# Patient Record
Sex: MALE | Race: WHITE
[De-identification: names, ages, dates, MRNs, and addresses within clinical notes are randomized per-mention and may not be internally consistent; named-entity substitution may affect disease eponyms.]

---

## 2021-02-12 ENCOUNTER — HOSPITAL ENCOUNTER (INPATIENT)
Dept: HOSPITAL 63 - ER | Age: 56
LOS: 5 days | Discharge: HOME | DRG: 177 | End: 2021-02-17
Attending: INTERNAL MEDICINE | Admitting: INTERNAL MEDICINE
Payer: OTHER GOVERNMENT

## 2021-02-12 VITALS — HEIGHT: 72 IN | WEIGHT: 172.84 LBS | BODY MASS INDEX: 23.41 KG/M2

## 2021-02-12 VITALS — DIASTOLIC BLOOD PRESSURE: 82 MMHG | SYSTOLIC BLOOD PRESSURE: 131 MMHG

## 2021-02-12 VITALS — SYSTOLIC BLOOD PRESSURE: 154 MMHG | DIASTOLIC BLOOD PRESSURE: 91 MMHG

## 2021-02-12 VITALS — SYSTOLIC BLOOD PRESSURE: 118 MMHG | DIASTOLIC BLOOD PRESSURE: 80 MMHG

## 2021-02-12 VITALS — SYSTOLIC BLOOD PRESSURE: 138 MMHG | DIASTOLIC BLOOD PRESSURE: 89 MMHG

## 2021-02-12 DIAGNOSIS — Z85.820: ICD-10-CM

## 2021-02-12 DIAGNOSIS — E78.5: ICD-10-CM

## 2021-02-12 DIAGNOSIS — G43.909: ICD-10-CM

## 2021-02-12 DIAGNOSIS — J12.82: ICD-10-CM

## 2021-02-12 DIAGNOSIS — J96.01: ICD-10-CM

## 2021-02-12 DIAGNOSIS — K21.9: ICD-10-CM

## 2021-02-12 DIAGNOSIS — M15.9: ICD-10-CM

## 2021-02-12 DIAGNOSIS — U07.1: Primary | ICD-10-CM

## 2021-02-12 LAB
ALBUMIN SERPL-MCNC: 2.7 G/DL (ref 3.4–5)
ALBUMIN/GLOB SERPL: 0.7 {RATIO} (ref 1–1.7)
ALP SERPL-CCNC: 108 U/L (ref 46–116)
ALT SERPL-CCNC: 62 U/L (ref 16–63)
ANION GAP SERPL CALC-SCNC: 10 MMOL/L (ref 6–14)
AST SERPL-CCNC: 51 U/L (ref 15–37)
BASOPHILS # BLD AUTO: 0 X10^3/UL (ref 0–0.2)
BASOPHILS NFR BLD: 0 % (ref 0–3)
BILIRUB SERPL-MCNC: 0.8 MG/DL (ref 0.2–1)
BUN/CREAT SERPL: 14 (ref 6–20)
CA-I SERPL ISE-MCNC: 13 MG/DL (ref 8–26)
CALCIUM SERPL-MCNC: 8.1 MG/DL (ref 8.5–10.1)
CHLORIDE SERPL-SCNC: 102 MMOL/L (ref 98–107)
CO2 SERPL-SCNC: 28 MMOL/L (ref 21–32)
CREAT SERPL-MCNC: 0.9 MG/DL (ref 0.7–1.3)
EOSINOPHIL NFR BLD: 0.1 X10^3/UL (ref 0–0.7)
EOSINOPHIL NFR BLD: 1 % (ref 0–3)
ERYTHROCYTE [DISTWIDTH] IN BLOOD BY AUTOMATED COUNT: 13.2 % (ref 11.5–14.5)
GFR SERPLBLD BASED ON 1.73 SQ M-ARVRAT: 88.3 ML/MIN
GLOBULIN SER-MCNC: 3.7 G/DL (ref 2.2–3.8)
GLUCOSE SERPL-MCNC: 115 MG/DL (ref 70–99)
HCT VFR BLD CALC: 42.4 % (ref 39–53)
HGB BLD-MCNC: 14 G/DL (ref 13–17.5)
LYMPHOCYTES # BLD: 0.5 X10^3/UL (ref 1–4.8)
LYMPHOCYTES NFR BLD AUTO: 7 % (ref 24–48)
MCH RBC QN AUTO: 30 PG (ref 25–35)
MCHC RBC AUTO-ENTMCNC: 33 G/DL (ref 31–37)
MCV RBC AUTO: 91 FL (ref 79–100)
MONO #: 0.7 X10^3/UL (ref 0–1.1)
MONOCYTES NFR BLD: 10 % (ref 0–9)
NEUT #: 5.9 X10^3UL (ref 1.8–7.7)
NEUTROPHILS NFR BLD AUTO: 83 % (ref 31–73)
PLATELET # BLD AUTO: 344 X10^3/UL (ref 140–400)
POTASSIUM SERPL-SCNC: 4.3 MMOL/L (ref 3.5–5.1)
PROT SERPL-MCNC: 6.4 G/DL (ref 6.4–8.2)
RBC # BLD AUTO: 4.67 X10^6/UL (ref 4.3–5.7)
SODIUM SERPL-SCNC: 140 MMOL/L (ref 136–145)
WBC # BLD AUTO: 7.2 X10^3/UL (ref 4–11)

## 2021-02-12 PROCEDURE — 80053 COMPREHEN METABOLIC PANEL: CPT

## 2021-02-12 PROCEDURE — 82803 BLOOD GASES ANY COMBINATION: CPT

## 2021-02-12 PROCEDURE — 85025 COMPLETE CBC W/AUTO DIFF WBC: CPT

## 2021-02-12 PROCEDURE — 99285 EMERGENCY DEPT VISIT HI MDM: CPT

## 2021-02-12 PROCEDURE — 81001 URINALYSIS AUTO W/SCOPE: CPT

## 2021-02-12 PROCEDURE — 71275 CT ANGIOGRAPHY CHEST: CPT

## 2021-02-12 PROCEDURE — 87040 BLOOD CULTURE FOR BACTERIA: CPT

## 2021-02-12 PROCEDURE — 85379 FIBRIN DEGRADATION QUANT: CPT

## 2021-02-12 PROCEDURE — 93005 ELECTROCARDIOGRAM TRACING: CPT

## 2021-02-12 PROCEDURE — 83880 ASSAY OF NATRIURETIC PEPTIDE: CPT

## 2021-02-12 PROCEDURE — 36415 COLL VENOUS BLD VENIPUNCTURE: CPT

## 2021-02-12 PROCEDURE — 94618 PULMONARY STRESS TESTING: CPT

## 2021-02-12 PROCEDURE — 83605 ASSAY OF LACTIC ACID: CPT

## 2021-02-12 PROCEDURE — 84484 ASSAY OF TROPONIN QUANT: CPT

## 2021-02-12 PROCEDURE — 71045 X-RAY EXAM CHEST 1 VIEW: CPT

## 2021-02-12 PROCEDURE — 85027 COMPLETE CBC AUTOMATED: CPT

## 2021-02-12 PROCEDURE — 96374 THER/PROPH/DIAG INJ IV PUSH: CPT

## 2021-02-12 PROCEDURE — 96361 HYDRATE IV INFUSION ADD-ON: CPT

## 2021-02-12 RX ADMIN — SODIUM CHLORIDE SCH MLS/HR: 0.9 INJECTION, SOLUTION INTRAVENOUS at 13:15

## 2021-02-12 RX ADMIN — ENOXAPARIN SODIUM SCH MG: 100 INJECTION SUBCUTANEOUS at 16:23

## 2021-02-12 RX ADMIN — SODIUM CHLORIDE SCH MLS/HR: 0.9 INJECTION, SOLUTION INTRAVENOUS at 22:26

## 2021-02-12 NOTE — RAD
EXAM: XR CHEST 1V



INDICATION: Reason: covid / Spl. Instructions:  / History: .



TECHNIQUE: Single view 



COMPARISON: None



FINDINGS:



The heart size is normal.



The great vessels appear unremarkable.



There is no hilar or mediastinal mass.



The lungs show peripheral predominant bilateral patchy parenchymal consolidation involving the left l
ower lobe and the right upper lobe most clearly but also involving the medial right lung and left ape
x.



There is no pleural effusion or pneumothorax.



There are no significant osseous abnormalities.



IMPRESSION:



Multifocal pneumonia bilaterally.





Electronically signed by: Sarath Sandoval MD (2/12/2021 8:48 AM) [id number]

## 2021-02-12 NOTE — NUR
NURSING NOTE 



PT DOING WELL, PT OXGYEN SPOT CHECK WAS 95%ON 1.5 LITERS RESTING IN BED. PT DENIES PAIN, 
DENIES NAUSEA, STATES HE IS DOING OKAY.



WILIAN VANN.

## 2021-02-12 NOTE — PHYS DOC
General Adult


EDM:


Chief Complaint:  SHORTNESS OF BREATH





HPI:


HPI:





Patient is a 55-year-old male coming in for shortness of breath, decreased p.o. 

intake, and diarrhea.  Patient was diagnosed with COVID-19 3 days ago.  His wife

is also diagnosed prior to that but has been getting better.  Patient states he 

has had decreased p.o. intake because he feels too short of breath to drink.  Is

also noted darker urine.  Has not had a fever for the last couple of days.  Has 

occasional body aches.  Denies any vomiting.  Has had congestion and rhinorrhea 

with thick yellow sputum, cough is nonproductive.





Review of Systems:


Review of Systems:


All other systems within normal limits except for as noted in the HPI





Current Medications:


Current Meds:





Current Medications








 Medications


  (Trade)  Dose


 Ordered  Sig/Justin  Start Time


 Stop Time Status Last Admin


Dose Admin


 


 Dexamethasone


 Sodium Phosphate


  (Decadron)  10 mg  1X  ONCE  2/12/21 08:30


 2/12/21 08:31 UNV 2/12/21 08:27


10 MG


 


 Sodium Chloride  1,000 ml @ 


 1,000 mls/hr  1X  ONCE  2/12/21 08:30


 2/12/21 09:29 UNV 2/12/21 08:26


1,000 MLS/HR











Allergies:


Allergies:





Allergies








Coded Allergies Type Severity Reaction Last Updated Verified


 


  No Known Drug Allergies    2/12/21 No











Physical Exam:


PE:


Constitutional: Well developed, well nourished, no acute distress, non-toxic 

appearance. []


HENT: Normocephalic, atraumatic, bilateral external ears normal, dry mucous 

membranes, nose normal. []


Eyes: PERRLA, conjunctiva normal, no discharge. [] 


Neck: No rigidity, supple, no stridor. [] 


Cardiovascular: Regular rate and rhythm, brisk cap refill []


Lungs & Thorax: Non labored symmetric respirations, moderate tachypnea []


Abdomen: Soft, nondistended.


Skin: Warm, dry, no erythema, no rash. [] 


Back: Unremarkable


Extremities: No deformities, range of motion grossly intact, no lower extremity 

edema [] 


Neurologic: Alert and oriented X 3, no focal deficits noted. []


Psychologic: Affect normal, judgement normal, mood normal. []





Current Patient Data:


Labs:





                                Laboratory Tests








Test


 2/12/21


07:55


 


POC Venous pH


 7.43


(7.32-7.42)  H


 


POC Venous pCO2


 37 mmHg


(41-51)  L


 


POC Venous pO2


 38 mmHg


(20-40)


 


Venous Blood HCO3


 24 mmol/L


(24-28)


 


POC Venous O2 Saturation


(Ander) 73 %  





 


POC FiO2 24  











EKG:


EKG:


Sinus rhythm, heart rate 81 bpm, normal axis, no ST elevation or depression, no 

ectopy, normal intervals.  []





Radiology/Procedures:


Radiology/Procedures:





CTA CHEST 





INDICATION:  hypoxia, covid + 





Comparison: Radiograph 2/12/2021.





TECHNIQUE: Following the uneventful administration of intravenous contrast, 100 

cc Omnipaque 350, axial CT sections were obtained through the lungs and upper 

abdomen. Multiplanar reconstructions and MIP images were obtained.





PQRS compliance statement:





One or more of the following individualized dose reduction techniques were 

utilized for this examination:


1. Automated exposure control


2. Adjustment of the mA and/or kV according to patient size


3. Use of iterative reconstruction technique





FINDINGS: 





Pulmonary arteries: No evidence of pulmonary thromboembolus disease





Lungs and Airways: Extensive bilateral groundglass opacities and consolidations.

 No abnormality of the central airways. 





Pleura: The pleural spaces are normal.





Heart and Mediastinum: The visualized thyroid is normal in size and attenuation.

 No axillary or supraclavicular lymphadenopathy. Few conspicuous mediastinal 

lymph nodes, likely reactive. Normal cardiac size. Coronary artery 

atherosclerotic disease. The great vessels of the thorax are normal. 





Abdomen: Limited images through the upper abdomen show no abnormality of the 

visualized organs.





Bones and Soft Tissues: The visualized bones and chest wall soft tissues are 

within normal limits.





IMPRESSION:  


1. No evidence of pulmonary thromboembolic disease.


2. Extensive bilateral groundglass opacities and consolidations, consistent with

 patient's history of infection.


3. Coronary artery atherosclerotic disease.





EXAM: XR CHEST 1V





INDICATION: Reason: covid / Spl. Instructions:  / History: .





TECHNIQUE: Single view 





COMPARISON: None





FINDINGS:





The heart size is normal.





The great vessels appear unremarkable.





There is no hilar or mediastinal mass.





The lungs show peripheral predominant bilateral patchy parenchymal consolidation

 involving the left lower lobe and the right upper lobe most clearly but also in

volving the medial right lung and left apex.





There is no pleural effusion or pneumothorax.





There are no significant osseous abnormalities.





IMPRESSION:





Multifocal pneumonia bilaterally.[]





Heart Score:


Risk Factors:


Risk Factors:  DM, Current or recent (<one month) smoker, HTN, HLP, family 

history of CAD, obesity.


Risk Scores:


Score 0 - 3:  2.5% MACE over next 6 weeks - Discharge Home


Score 4 - 6:  20.3% MACE over next 6 weeks - Admit for Clinical Observation


Score 7 - 10:  72.7% MACE over next 6 weeks - Early Invasive Strategies





Course & Med Decision Making:


Course & Med Decision Making


Pertinent Labs and Imaging studies reviewed. (See chart for details)





[]





Dragon Disclaimer:


Dragon Disclaimer:


This electronic medical record was generated, in whole or in part, using a voice

 recognition dictation system.





Departure


Departure:


Impression:  


   Primary Impression:  


   COVID-19


Disposition:  09 ADMITTED AS INPT THIS HOSP


Admitting Physician:  Nicol Ortiz


Condition:  STABLE


Referrals:  


ELLIE DUNCAN DO (PCP)











ARPAN MCKENZIE MD              Feb 12, 2021 08:44

## 2021-02-12 NOTE — EKG
[facility] [address], [place] [address]

Test Date:    2021               Test Time:    08:12:57

Pat Name:     MALLORY SOLORIO            Department:   

Patient ID:   [id number]           Room:          

Gender:       M                        Technician:   

:          [id number]               Requested By: ARPAN MCKENZIE

Order Number: [id number]            Reading MD:     

                                 Measurements

Intervals                              Axis          

Rate:         81                       P:            41

SD:           130                      QRS:          34

QRSD:         90                       T:            16

QT:           350                                    

QTc:          407                                    

                           Interpretive Statements

SINUS RHYTHM

NORMAL ECG

RI6.02

No previous ECG available for comparison

## 2021-02-12 NOTE — HP
ADMIT DATE:  02/12/2021



HISTORY OF PRESENT ILLNESS:  The patient is a 55-year-old  male

patient, a retired  who is working as a civilian at the [employer] and who was tested positive 4 days ago, specifically Tuesday,

02/09/2021.  He apparently has had cough and shortness of breath, some nausea

and vomiting as well as diarrhea and was tested positive.  His wife was also

diagnosed prior to that and has been living together in the same house.  He

denied any fever.  He has occasional body aches and has had some congestion,

rhinorrhea, thick yellow sputum and cough that is nonproductive.  He was

extensively investigated in the Emergency Room, was found to have the CT scan of

the chest evidence of extensive bilateral ground-glass opacities and

consolidation consistent with the patient's history of infection and therefore,

the patient was admitted with COVID-19 pneumonia as well as acute hypoxic

respiratory failure.



PAST MEDICAL HISTORY:  Significant for hyperlipidemia, migraine headache,

gastroesophageal reflux disease and obstructive sleep apnea.



PAST SURGICAL HISTORY:  Significant for melanoma resection, surgery on his left

knee and also his testicles.



ALLERGIES:  He has no known drug allergies.



MEDICATIONS:  He is currently on following medications:  He is on Crestor 5 mg

at bedtime, omeprazole 20 mg once a day, naproxen 500 mg twice a day and Zomig

for migraine headache.  He also takes over-the-counter Tylenol and multivitamin.



FAMILY HISTORY:  Noncontributory.



SOCIAL HISTORY:  He is , lives with his wife.  He does not smoke.  Drinks

alcohol occasionally.  He does not use any drugs.  He is a civilian contractor

working at the [employer].



REVIEW OF SYSTEMS:  As per history of present illness.



PHYSICAL EXAMINATION:

GENERAL:  On arrival to the Emergency Room, he looked well and was clearly in no

apparent respiratory distress.  There is no pallor, jaundice or cyanosis.  No

lymphadenopathy, no thyromegaly.  No jugular venous distention.  No lower limb

edema.

VITAL SIGNS:  His heart rate was 82, blood pressure was 131/91, temperature was

98.3, respiratory rate was 45 and oxygen saturation was 94% on 9 liters of

oxygen.

HEAD, EYES, EARS, NOSE AND THROAT:  Showed normocephalic, atraumatic.

NECK:  Supple.

HEART:  Normal first and second heart sounds.  No gallop or murmur.

CHEST:  Shows central trachea, equal bilateral chest expansion, air entry,

vesicular sounds, bilateral crepitation.  I could not appreciate any rhonchi.

ABDOMEN:  Distended, soft, nontender.

NEUROLOGIC:  He was awake, alert, responding appropriately.  All cranial nerves

intact.

EXTREMITIES:  He moves extremities without difficulty, ambulates without

assistance or assistive devices.



LABORATORY DATA:  His lab work showed a white cell count 7200, hemoglobin 14,

hematocrit 42, MCV 91, and platelet count 344,000.  His D-dimer was 0.91.  Serum

sodium 140, potassium 4.3, chloride 102, bicarbonate 28, anion gap of 10, BUN

13, creatinine 0.9, estimated GFR was 88 mL per minute, his glucose 118.  Lactic

acid was 1.3, calcium was 8.1.  Total bilirubin, AST, ALT, alkaline phosphatase

were normal.  Beta natriuretic peptide was 108.  Total protein was 6.4, albumin

was 2.7.  His arterial blood gases showed a pH of 7.43, pCO2 of 37, pO2 of 38,

bicarbonate 24, and oxygen saturation was 73% on FiO2 of 24%.



IMAGING STUDIES:  His chest x-ray showed the heart size is normal.  The great

vessels appear unremarkable.  There is no hilar or mediastinal mass.  The lungs

show peripheral predominant bilateral patchy parenchymal consolidation involving

the left lower lobe and right upper lobe, most clearly, but also involving the

medial right lung and left apex.  There is no pleural effusion or pneumothorax. 

There are no significant osseous abnormalities.  He did have a CT angio of the

chest, which showed that there is no evidence of pulmonary thromboembolism. 

There is extensive bilateral ground-glass opacities and consolidation.  No

abnormality of the central airways.  The pleural spaces are normal.  The heart

and mediastinum showed were normal size.  No axillary or supraclavicular

lymphadenopathy.  Conspicuous mediastinal lymph nodes, likely reactive.  Normal

size heart with coronary artery atherosclerotic disease.  The great vessels of

thorax are normal.  The upper abdomen showed no abnormalities of visualized

organs.  Bones and soft tissues are within normal limits.



ASSESSMENT AND PLAN:  The patient was admitted with COVID-19 pneumonia and acute

hypoxic respiratory failure.  His other medical problems include migraine

headache, gastroesophageal reflux disease, hyperlipidemia and generalized

osteoarthritis.  The patient will be continued on dexamethasone.  I would also

start him on ceftriaxone and Zithromax as well as Lovenox and will follow him

closely on a daily basis and decide the further management accordingly.





______________________________

MARQUEZ NELSON MD



DR:  JORGE/juan carlos  JOB#:  [id number] / [id number]

DD:  02/12/2021 15:45  DT:  02/12/2021 16:09

## 2021-02-12 NOTE — NUR
NURSING NOTE ADMIT



PT ADMIT TO ROOM 121 FOR DX OF COVID + AND HYPOXIA. PT C/O SOA. STATES MONDAY FELT HORRIBLE. 
TUESDAY WORSE, WENT TO GET SWABBED CAME BACK POSITIVE. SOA, PANIC FEELING, COUGHING MAKES IT 
HARDER TO BREATHE. DIARRHEA THE LAST 3 DAYS, LBM 2/11 NOT EATING MUCH/NO APPETITE. URINE 
BROWN/YELLOW.



PT SETTLED IN ROOM. NO COMPLICATIONS.



WILIAN VANN.

## 2021-02-12 NOTE — RAD
CTA CHEST 



INDICATION:  hypoxia, covid + 



Comparison: Radiograph 2/12/2021.



TECHNIQUE: Following the uneventful administration of intravenous contrast, 100 cc Omnipaque 350, axi
al CT sections were obtained through the lungs and upper abdomen. Multiplanar reconstructions and MIP
 images were obtained.



RS compliance statement:



One or more of the following individualized dose reduction techniques were utilized for this examinat
ion:

1. Automated exposure control

2. Adjustment of the mA and/or kV according to patient size

3. Use of iterative reconstruction technique



FINDINGS: 



Pulmonary arteries: No evidence of pulmonary thromboembolus disease



Lungs and Airways: Extensive bilateral groundglass opacities and consolidations. No abnormality of th
e central airways. 



Pleura: The pleural spaces are normal.



Heart and Mediastinum: The visualized thyroid is normal in size and attenuation. No axillary or supra
clavicular lymphadenopathy. Few conspicuous mediastinal lymph nodes, likely reactive. Normal cardiac 
size. Coronary artery atherosclerotic disease. The great vessels of the thorax are normal. 



Abdomen: Limited images through the upper abdomen show no abnormality of the visualized organs.



Bones and Soft Tissues: The visualized bones and chest wall soft tissues are within normal limits.



IMPRESSION:  

1. No evidence of pulmonary thromboembolic disease.

2. Extensive bilateral groundglass opacities and consolidations, consistent with patient's history of
 infection.

3. Coronary artery atherosclerotic disease.



Electronically signed by: Bello Sheets MD (2/12/2021 10:18 AM) [id number]

## 2021-02-13 VITALS — SYSTOLIC BLOOD PRESSURE: 135 MMHG | DIASTOLIC BLOOD PRESSURE: 81 MMHG

## 2021-02-13 VITALS — DIASTOLIC BLOOD PRESSURE: 83 MMHG | SYSTOLIC BLOOD PRESSURE: 113 MMHG

## 2021-02-13 VITALS — SYSTOLIC BLOOD PRESSURE: 125 MMHG | DIASTOLIC BLOOD PRESSURE: 82 MMHG

## 2021-02-13 VITALS — SYSTOLIC BLOOD PRESSURE: 132 MMHG | DIASTOLIC BLOOD PRESSURE: 87 MMHG

## 2021-02-13 LAB
ALBUMIN SERPL-MCNC: 2.1 G/DL (ref 3.4–5)
ALBUMIN/GLOB SERPL: 0.5 {RATIO} (ref 1–1.7)
ALP SERPL-CCNC: 94 U/L (ref 46–116)
ALT SERPL-CCNC: 67 U/L (ref 16–63)
ANION GAP SERPL CALC-SCNC: 10 MMOL/L (ref 6–14)
APTT PPP: YELLOW S
AST SERPL-CCNC: 50 U/L (ref 15–37)
BACTERIA #/AREA URNS HPF: 0 /HPF
BASOPHILS # BLD AUTO: 0 X10^3/UL (ref 0–0.2)
BASOPHILS NFR BLD: 0 % (ref 0–3)
BILIRUB SERPL-MCNC: 0.4 MG/DL (ref 0.2–1)
BILIRUB UR QL STRIP: (no result)
BUN/CREAT SERPL: 18 (ref 6–20)
CA-I SERPL ISE-MCNC: 16 MG/DL (ref 8–26)
CALCIUM SERPL-MCNC: 7.7 MG/DL (ref 8.5–10.1)
CHLORIDE SERPL-SCNC: 108 MMOL/L (ref 98–107)
CO2 SERPL-SCNC: 24 MMOL/L (ref 21–32)
CREAT SERPL-MCNC: 0.9 MG/DL (ref 0.7–1.3)
EOSINOPHIL NFR BLD: 0 % (ref 0–3)
EOSINOPHIL NFR BLD: 0 X10^3/UL (ref 0–0.7)
ERYTHROCYTE [DISTWIDTH] IN BLOOD BY AUTOMATED COUNT: 13.2 % (ref 11.5–14.5)
FIBRINOGEN PPP-MCNC: CLEAR MG/DL
GFR SERPLBLD BASED ON 1.73 SQ M-ARVRAT: 88.3 ML/MIN
GLOBULIN SER-MCNC: 4.2 G/DL (ref 2.2–3.8)
GLUCOSE SERPL-MCNC: 137 MG/DL (ref 70–99)
GLUCOSE UR STRIP-MCNC: (no result) MG/DL
HCT VFR BLD CALC: 37.2 % (ref 39–53)
HGB BLD-MCNC: 12.5 G/DL (ref 13–17.5)
LYMPHOCYTES # BLD: 0.5 X10^3/UL (ref 1–4.8)
LYMPHOCYTES NFR BLD AUTO: 7 % (ref 24–48)
MCH RBC QN AUTO: 30 PG (ref 25–35)
MCHC RBC AUTO-ENTMCNC: 34 G/DL (ref 31–37)
MCV RBC AUTO: 90 FL (ref 79–100)
MONO #: 0.5 X10^3/UL (ref 0–1.1)
MONOCYTES NFR BLD: 7 % (ref 0–9)
NEUT #: 5.7 X10^3UL (ref 1.8–7.7)
NEUTROPHILS NFR BLD AUTO: 86 % (ref 31–73)
NITRITE UR QL STRIP: (no result)
PLATELET # BLD AUTO: 359 X10^3/UL (ref 140–400)
POTASSIUM SERPL-SCNC: 4 MMOL/L (ref 3.5–5.1)
PROT SERPL-MCNC: 6.3 G/DL (ref 6.4–8.2)
RBC # BLD AUTO: 4.14 X10^6/UL (ref 4.3–5.7)
RBC #/AREA URNS HPF: 0 /HPF (ref 0–2)
SODIUM SERPL-SCNC: 142 MMOL/L (ref 136–145)
SP GR UR STRIP: 1.02
SQUAMOUS #/AREA URNS LPF: (no result) /LPF
UROBILINOGEN UR-MCNC: 1 MG/DL
WBC # BLD AUTO: 6.6 X10^3/UL (ref 4–11)
WBC #/AREA URNS HPF: (no result) /HPF (ref 0–4)

## 2021-02-13 RX ADMIN — ENOXAPARIN SODIUM SCH MG: 100 INJECTION SUBCUTANEOUS at 17:07

## 2021-02-13 RX ADMIN — Medication SCH CAP: at 19:58

## 2021-02-13 RX ADMIN — Medication SCH CAP: at 11:42

## 2021-02-13 RX ADMIN — SODIUM CHLORIDE SCH MLS/HR: 0.9 INJECTION, SOLUTION INTRAVENOUS at 11:43

## 2021-02-13 RX ADMIN — AZITHROMYCIN SCH MG: 250 TABLET, FILM COATED ORAL at 07:22

## 2021-02-13 RX ADMIN — ACETAMINOPHEN PRN MG: 500 TABLET ORAL at 13:41

## 2021-02-13 RX ADMIN — IPRATROPIUM BROMIDE AND ALBUTEROL SCH PUFF: 20; 100 SPRAY, METERED RESPIRATORY (INHALATION) at 17:06

## 2021-02-13 RX ADMIN — IPRATROPIUM BROMIDE AND ALBUTEROL SCH PUFF: 20; 100 SPRAY, METERED RESPIRATORY (INHALATION) at 19:59

## 2021-02-13 RX ADMIN — SODIUM CHLORIDE SCH MLS/HR: 0.9 INJECTION, SOLUTION INTRAVENOUS at 19:59

## 2021-02-13 RX ADMIN — DEXAMETHASONE SODIUM PHOSPHATE SCH MG: 4 INJECTION, SOLUTION INTRAMUSCULAR; INTRAVENOUS at 07:23

## 2021-02-13 NOTE — NUR
Nursing note:



Pt A&Ox4, no c/o nausea or pain. Pt did feel SOA; O2 sat 94%. Pt stated he felt he was 
unable to catch his breath or take deep breaths. Pt rested comfortably through shift. 
Discussed proning with pt.

## 2021-02-13 NOTE — PN
DATE:  02/13/2021



SUBJECTIVE:  The patient is resting, slightly propped up in bed, in no apparent

distress.  He continued to have cough, fever and some drenching sweats; however,

his oxygen saturation was 94% on room air.



OBJECTIVE:

GENERAL:  When I examined him this afternoon, he looked well and was clearly in

no apparent respiratory distress.  No pallor, jaundice, cyanosis or thyromegaly.

 No jugular venous distension.  No limb edema.

VITAL SIGNS:  His heart rate was 78, blood pressure was 135/81, temperature was

97, respiratory rate was 18 and oxygen saturation was 94% on 2 liters of oxygen.

HEAD, EYES, EARS, NOSE, AND THROAT:  Showed normocephalic, atraumatic.

NECK:  Supple.

HEART:  Showed normal first and second heart sounds.  No gallop, rub or murmur.

CHEST:  Shows central trachea, equal bilateral chest expansion, air entry,

vesicular sounds with crepitation mostly on both sides posteriorly.

ABDOMEN:  Distended, soft, nontender.

NEUROLOGIC:  He was grossly intact.



His intake and output are incompletely recorded.



LABORATORY DATA:  His lab work this morning showed a white cell count of 6600,

hemoglobin 12.5, hematocrit 37, MCV 90 and platelet count 359,000 with normal

manual differential.  Serum sodium was 142, potassium 4, chloride 108,

bicarbonate was 24, anion gap of 10, BUN 16, creatinine 0.9, estimated GFR was

88 mL per minute.  His glucose 137, lactic acid was 1.3, calcium was 7.7.  Total

bilirubin and alkaline phosphatase normal.  AST, ALT were elevated.  His total

protein was 6.3, albumin 2.5.  His D-dimer was 0.91 mg/dL.



ASSESSMENT:

1.  Acute COVID-19 pneumonia.

2.  Acute hypoxic respiratory failure.  Other problems include:

A.  Migraine headache.

B.  Gastroesophageal reflux disease.

C.  Hyperlipidemia.

D.  Generalized osteoarthritis.



PLAN:  Continue with antibiotic.  Continue with steroids.  Continue with DVT

prophylaxis.





______________________________

MARQUEZ NELSON MD



DR:  JORGE/juan carlos  JOB#:  [id number] / [id number]

DD:  02/13/2021 13:58  DT:  02/13/2021 14:05

## 2021-02-14 VITALS — SYSTOLIC BLOOD PRESSURE: 124 MMHG | DIASTOLIC BLOOD PRESSURE: 81 MMHG

## 2021-02-14 VITALS — SYSTOLIC BLOOD PRESSURE: 133 MMHG | DIASTOLIC BLOOD PRESSURE: 84 MMHG

## 2021-02-14 VITALS — SYSTOLIC BLOOD PRESSURE: 126 MMHG | DIASTOLIC BLOOD PRESSURE: 87 MMHG

## 2021-02-14 VITALS — DIASTOLIC BLOOD PRESSURE: 87 MMHG | SYSTOLIC BLOOD PRESSURE: 130 MMHG

## 2021-02-14 RX ADMIN — IPRATROPIUM BROMIDE AND ALBUTEROL SCH PUFF: 20; 100 SPRAY, METERED RESPIRATORY (INHALATION) at 07:59

## 2021-02-14 RX ADMIN — IPRATROPIUM BROMIDE AND ALBUTEROL SCH PUFF: 20; 100 SPRAY, METERED RESPIRATORY (INHALATION) at 12:00

## 2021-02-14 RX ADMIN — IPRATROPIUM BROMIDE AND ALBUTEROL SCH PUFF: 20; 100 SPRAY, METERED RESPIRATORY (INHALATION) at 20:00

## 2021-02-14 RX ADMIN — DEXAMETHASONE SODIUM PHOSPHATE SCH MG: 4 INJECTION, SOLUTION INTRAMUSCULAR; INTRAVENOUS at 07:59

## 2021-02-14 RX ADMIN — SODIUM CHLORIDE SCH MLS/HR: 0.9 INJECTION, SOLUTION INTRAVENOUS at 05:15

## 2021-02-14 RX ADMIN — ENOXAPARIN SODIUM SCH MG: 100 INJECTION SUBCUTANEOUS at 16:59

## 2021-02-14 RX ADMIN — Medication SCH CAP: at 07:59

## 2021-02-14 RX ADMIN — IPRATROPIUM BROMIDE AND ALBUTEROL SCH PUFF: 20; 100 SPRAY, METERED RESPIRATORY (INHALATION) at 16:00

## 2021-02-14 RX ADMIN — Medication SCH CAP: at 20:36

## 2021-02-14 RX ADMIN — ACETAMINOPHEN PRN MG: 500 TABLET ORAL at 20:36

## 2021-02-14 RX ADMIN — AZITHROMYCIN SCH MG: 250 TABLET, FILM COATED ORAL at 07:59

## 2021-02-14 NOTE — NUR
Nursing note:



Pt rested through much of shift. Pt on room air at beginning of shift, O2 as charted; 2L NC 
for noc d/t JUAN R. Pt stated he does feel better, breathing more easily than previous shift. 
VS as noted in chart.

## 2021-02-14 NOTE — PN
DATE:  02/14/2021



SUBJECTIVE:  The patient is resting, slightly propped up in bed, in no apparent

distress, continued to have cough, although much less than before.  He is

maintaining his oxygen saturation at 93% on room air at rest, however, he

desaturates down to 84% on room air on exertion as we did a 6-minute walk.



PHYSICAL EXAMINATION:

GENERAL:  When I examined him, he looked well and was clearly in no apparent

respiratory distress.  There is no pallor, jaundice, cyanosis or thyromegaly. 

No jugular venous distension.  No limb edema.

VITAL SIGNS:  His heart rate was 73, blood pressure was 130/87, temperature

97.1, respiratory rate 20, and oxygen saturation was 98% on 3 liters of oxygen. 

In fact, on room air, he is doing 93-94%.

HEAD, EYES, EARS, NOSE, AND THROAT:  Showed normocephalic, atraumatic.

NECK:  Supple.

HEART:  Showed normal first and second heart sounds.  No gallop, rub or murmur.

CHEST:  Showed central trachea, equal bilateral chest expansion, air entry,

vesicular sounds.  He has bilateral basal crepitation posteriorly, I could not

appreciate any rhonchi.

ABDOMEN:  Scaphoid, soft, nontender.

NEUROLOGIC:  He was grossly intact.



His intake was 1200, no output was recorded.



LABORATORY DATA:  Actually, he has no lab work done this morning.



ASSESSMENT:

1.  Acute COVID-19 pneumonia.

2.  Acute hypoxic respiratory failure.

3.  Other medical problems include:

A.  Migraine headache.

B.  Gastroesophageal reflux disease.

C.  Hyperlipidemia.

D.  Generalized osteoarthritis.



PLAN:  To continue with IV antibiotic.  Continue with dexamethasone.  Continue

with DVT prophylaxis.  Continue with Combivent inhaler.  I will repeat all his

lab work tomorrow.





______________________________

MARQUEZ NELSON MD



DR:  JORGE/juan carlos  JOB#:  [id number] / [id number]

DD:  02/14/2021 12:19  DT:  02/14/2021 14:26

## 2021-02-15 VITALS — DIASTOLIC BLOOD PRESSURE: 89 MMHG | SYSTOLIC BLOOD PRESSURE: 121 MMHG

## 2021-02-15 VITALS — DIASTOLIC BLOOD PRESSURE: 68 MMHG | SYSTOLIC BLOOD PRESSURE: 138 MMHG

## 2021-02-15 VITALS — DIASTOLIC BLOOD PRESSURE: 95 MMHG | SYSTOLIC BLOOD PRESSURE: 137 MMHG

## 2021-02-15 LAB
ALBUMIN SERPL-MCNC: 2.1 G/DL (ref 3.4–5)
ALBUMIN/GLOB SERPL: 0.6 {RATIO} (ref 1–1.7)
ALP SERPL-CCNC: 93 U/L (ref 46–116)
ALT SERPL-CCNC: 186 U/L (ref 16–63)
ANION GAP SERPL CALC-SCNC: 8 MMOL/L (ref 6–14)
AST SERPL-CCNC: 59 U/L (ref 15–37)
BILIRUB SERPL-MCNC: 0.3 MG/DL (ref 0.2–1)
BUN/CREAT SERPL: 17 (ref 6–20)
CA-I SERPL ISE-MCNC: 15 MG/DL (ref 8–26)
CALCIUM SERPL-MCNC: 7.5 MG/DL (ref 8.5–10.1)
CHLORIDE SERPL-SCNC: 106 MMOL/L (ref 98–107)
CO2 SERPL-SCNC: 27 MMOL/L (ref 21–32)
CREAT SERPL-MCNC: 0.9 MG/DL (ref 0.7–1.3)
ERYTHROCYTE [DISTWIDTH] IN BLOOD BY AUTOMATED COUNT: 12.9 % (ref 11.5–14.5)
GFR SERPLBLD BASED ON 1.73 SQ M-ARVRAT: 88.3 ML/MIN
GLOBULIN SER-MCNC: 3.8 G/DL (ref 2.2–3.8)
GLUCOSE SERPL-MCNC: 104 MG/DL (ref 70–99)
HCT VFR BLD CALC: 36.3 % (ref 39–53)
HGB BLD-MCNC: 12.1 G/DL (ref 13–17.5)
MCH RBC QN AUTO: 30 PG (ref 25–35)
MCHC RBC AUTO-ENTMCNC: 33 G/DL (ref 31–37)
MCV RBC AUTO: 89 FL (ref 79–100)
PLATELET # BLD AUTO: 405 X10^3/UL (ref 140–400)
POTASSIUM SERPL-SCNC: 4 MMOL/L (ref 3.5–5.1)
PROT SERPL-MCNC: 5.9 G/DL (ref 6.4–8.2)
RBC # BLD AUTO: 4.07 X10^6/UL (ref 4.3–5.7)
SODIUM SERPL-SCNC: 141 MMOL/L (ref 136–145)
WBC # BLD AUTO: 10.3 X10^3/UL (ref 4–11)

## 2021-02-15 RX ADMIN — IPRATROPIUM BROMIDE AND ALBUTEROL SCH PUFF: 20; 100 SPRAY, METERED RESPIRATORY (INHALATION) at 20:00

## 2021-02-15 RX ADMIN — DEXAMETHASONE SODIUM PHOSPHATE SCH MG: 4 INJECTION, SOLUTION INTRAMUSCULAR; INTRAVENOUS at 07:54

## 2021-02-15 RX ADMIN — AZITHROMYCIN SCH MG: 250 TABLET, FILM COATED ORAL at 07:53

## 2021-02-15 RX ADMIN — ACETAMINOPHEN PRN MG: 500 TABLET ORAL at 21:32

## 2021-02-15 RX ADMIN — ENOXAPARIN SODIUM SCH MG: 100 INJECTION SUBCUTANEOUS at 16:42

## 2021-02-15 RX ADMIN — IPRATROPIUM BROMIDE AND ALBUTEROL SCH PUFF: 20; 100 SPRAY, METERED RESPIRATORY (INHALATION) at 11:48

## 2021-02-15 RX ADMIN — IPRATROPIUM BROMIDE AND ALBUTEROL SCH PUFF: 20; 100 SPRAY, METERED RESPIRATORY (INHALATION) at 07:55

## 2021-02-15 RX ADMIN — Medication SCH CAP: at 07:53

## 2021-02-15 RX ADMIN — Medication SCH CAP: at 21:32

## 2021-02-15 RX ADMIN — IPRATROPIUM BROMIDE AND ALBUTEROL SCH PUFF: 20; 100 SPRAY, METERED RESPIRATORY (INHALATION) at 16:00

## 2021-02-15 NOTE — PN
DATE:  02/15/2021



SUBJECTIVE:  The patient is resting, slightly propped up in bed, in no apparent

distress.  On questioning him, he stated that he has generally felt much better,

has less cough.  Denied any chest pain; however, he obviously have shortness of

breath on exertion.  He is maintaining his oxygen saturation of 92% on 3 liters

of oxygen by nasal cannula.



PHYSICAL EXAMINATION:

GENERAL:  When I examined him, he looked well and was clearly in no apparent

respiratory distress.  No pallor, jaundice, cyanosis or thyromegaly.  No jugular

venous distension.  No limb edema.

VITAL SIGNS:  Her heart rate was 72, blood pressure 121/89, temperature was

97.3, respiratory rate was 18, and oxygen saturation was 92% on 3 liters of

oxygen.

HEAD, EYES, EARS, NOSE, AND THORAT:  Normocephalic, atraumatic.

NECK:  Supple.

HEART:  Showed normal first and second heart sounds.  No gallop or murmur.

CHEST:  Clear to auscultation.  No crepitation or rhonchi.

ABDOMEN:  Distended, soft, nontender.  No guarding or rigidity.  No

organomegaly.  All hernial orifice intact.  Bowel sounds normal.

NEUROLOGIC:  He was awake, alert, responding.

CHEST:  Shows central trachea, equal bilateral chest expansion, air entry,

vesicular sounds with bilateral basal crepitation.  I could not appreciate any

rhonchi.

ABDOMEN:  Distended, soft, nontender.

NEUROLOGICAL:  He was awake, alert, responding appropriately.  All cranial

nerves are intact.  He moves extremities without difficulty, ambulates without

assistance or assistive devices.



His intake over the last 24 hours was 2530.  No output was recorded.



LABORATORY DATA:  Her lab work this morning showed a serum sodium 141, potassium

4, chloride 106, bicarbonate 27, anion gap of 8, BUN 15, creatinine 0.9,

estimated GFR was 88 mL per minute, his glucose 104, calcium was 7.5.  His total

bilirubin and alkaline phosphatase normal.  AST, ALT slightly elevated and

trending to be higher.  His total protein was 5.9, albumin was 2.1.  His white

cell count was 10,000, hemoglobin 12, hematocrit 36, MCV 89, and platelet count

of 405,000.  D-dimer was slightly high at 1.01.  So far his blood culture showed

no growth after 3 days.



ASSESSMENT:

1.  Acute COVID-19 pneumonia.

2.  Acute hypoxic respiratory failure.

3.  Other medical problems include;

A.  Migraine headache.

B.  Gastroesophageal reflux disease.

C.  Hyperlipidemia.

D.  Generalized osteoarthritis.



PLAN:  Continue with IV antibiotic.  Continue with dexamethasone.  Continue with

DVT prophylaxis.  Continue with Combivent inhaler.  I will also repeat his chest

x-ray and discuss the finding with Dr. Dupree and Dr. Avila as he does not

seem to be improving dramatically, although he remained to be stable around

maintaining his oxygen saturation at 92% on 3 liters of oxygen.





______________________________

MARQUEZ NELSON MD



DR:  JORGE/juan carlos  JOB#:  [id number] / [id number]

DD:  02/15/2021 14:06  DT:  02/15/2021 15:02

## 2021-02-15 NOTE — RAD
XR CHEST 1V 



INDICATION: worsening shortness of breath . 



COMPARISON STUDY: 2/12/2021.



FINDINGS:



Lungs: Normal lung volume. Stable patchy bilateral heterogeneous opacities. Normal pulmonary vasculat
ure.



Pleura: No pleural effusion or pneumothorax.



Heart and Mediastinum: Stable cardiomediastinal silhouette and great vessels. 



IMPRESSION:  



Stable patchy bilateral heterogeneous opacities.



Electronically signed by: Bello Sheets MD (2/15/2021 3:00 PM) [id number]

## 2021-02-15 NOTE — NUR
NURSING NOTE 



PT IN BED THIS AM UPON ASSESSMENT AND MEDICATION ADMINISTRATION. PT A&O. DENIES PAIN. 



THIS AFTERNOON, PT OXYGEN DOWN TO 88%. PT OXYGEN INCREASED TO 4 LITERS. 



PT IS ANXIOUS ABOUT HIS COVID AND TEARFUL.



WILIAN VANN.

## 2021-02-16 VITALS — SYSTOLIC BLOOD PRESSURE: 141 MMHG | DIASTOLIC BLOOD PRESSURE: 91 MMHG

## 2021-02-16 VITALS — DIASTOLIC BLOOD PRESSURE: 88 MMHG | SYSTOLIC BLOOD PRESSURE: 121 MMHG

## 2021-02-16 VITALS — DIASTOLIC BLOOD PRESSURE: 86 MMHG | SYSTOLIC BLOOD PRESSURE: 117 MMHG

## 2021-02-16 PROCEDURE — 5A0935A ASSISTANCE WITH RESPIRATORY VENTILATION, LESS THAN 24 CONSECUTIVE HOURS, HIGH NASAL FLOW/VELOCITY: ICD-10-PCS | Performed by: HOSPITALIST

## 2021-02-16 RX ADMIN — Medication SCH CAP: at 20:59

## 2021-02-16 RX ADMIN — ENOXAPARIN SODIUM SCH MG: 100 INJECTION SUBCUTANEOUS at 11:21

## 2021-02-16 RX ADMIN — IPRATROPIUM BROMIDE AND ALBUTEROL SCH PUFF: 20; 100 SPRAY, METERED RESPIRATORY (INHALATION) at 11:21

## 2021-02-16 RX ADMIN — IPRATROPIUM BROMIDE AND ALBUTEROL SCH PUFF: 20; 100 SPRAY, METERED RESPIRATORY (INHALATION) at 16:00

## 2021-02-16 RX ADMIN — AZITHROMYCIN SCH MG: 250 TABLET, FILM COATED ORAL at 09:00

## 2021-02-16 RX ADMIN — IPRATROPIUM BROMIDE AND ALBUTEROL SCH PUFF: 20; 100 SPRAY, METERED RESPIRATORY (INHALATION) at 08:00

## 2021-02-16 RX ADMIN — IPRATROPIUM BROMIDE AND ALBUTEROL SCH PUFF: 20; 100 SPRAY, METERED RESPIRATORY (INHALATION) at 20:00

## 2021-02-16 RX ADMIN — ACETAMINOPHEN PRN MG: 500 TABLET ORAL at 20:59

## 2021-02-16 RX ADMIN — DEXAMETHASONE SODIUM PHOSPHATE SCH MG: 4 INJECTION, SOLUTION INTRAMUSCULAR; INTRAVENOUS at 09:00

## 2021-02-16 RX ADMIN — Medication SCH CAP: at 09:00

## 2021-02-16 NOTE — PN
DATE:  02/16/2021



SUBJECTIVE:  The patient is resting, slightly propped up in bed, in no apparent

distress.  He is feeling generally better.  He continued to have cough and has

bilateral coarse crepitation posteriorly; however, he has no fever.



PHYSICAL EXAMINATION:

GENERAL:  When I examined him, he looked well and was clearly in no apparent

respiratory distress.  No pallor, jaundice, cyanosis or thyromegaly.  No jugular

venous distension.  No lower limb edema.

VITAL SIGNS:  His heart rate was 94, blood pressure was 117/86, temperature of

96, respiratory rate 24 and oxygen saturation was 95% on 5 liters of oxygen.

HEAD, EYES, EARS, NOSE AND THROAT:  Showed normocephalic, atraumatic.

NECK:  Supple.

HEART:  Showed normal first and second heart sounds.  No gallop, rub or murmur.

CHEST:  Shows central trachea, equal bilateral expansion, air entry, vesicular

sounds with bilateral coarse crepitation posteriorly.  I could not appreciate

any rhonchi.

ABDOMEN:  Scaphoid, soft, nontender.

NEUROLOGIC:  He is grossly intact.



His intake and output were incompletely recorded.



LABORATORY DATA:  His lab work showed a white cell count of 10,000; hemoglobin

12; hematocrit 36; MCV 89 and platelet count of ____.  His serum sodium was 141,

potassium 4, chloride 106, bicarbonate 27, anion gap of 8, BUN 15, creatinine

0.9, estimated GFR was 88 mL per minute, his glucose 104, calcium was 7.5. 

Total bilirubin and alkaline phosphatase is normal.  AST, ALT are elevated. 

Total protein was 5.9, albumin was 2.2.  His D-dimer was slightly up at 1.01. 

Urinalysis essentially unremarkable.



ASSESSMENT:

1.  COVID-19 pneumonia.

2.  Acute hypoxic respiratory failure.

3.  Other medical problems include:

A.  Migraine headache.

B.  Gastroesophageal reflux disease.

C.  Hyperlipidemia.

D.  Generalized osteoarthritis.



PLAN:  Continue with IV antibiotic.  Continue with dexamethasone.  Continue with

DVT prophylaxis and Combivent inhaler.  I did speak with Dr. Dupree and he

basically did not recommend any other forms of therapy and continue as he is. 

If remains stable, I explained to him that it is quite feasible for him to be

discharged home on oxygen to continue with tapering course of steroids and to

finish his antibiotic treatment and to have an appointment for followup with the

pulmonologist in 2-3 weeks' time, sooner if needed.





______________________________

MARQUEZ NELSON MD



DR:  JORGE/juan carlos  JOB#:  [id number] / [id number]

DD:  02/16/2021 17:00  DT:  02/16/2021 17:46

## 2021-02-17 VITALS — SYSTOLIC BLOOD PRESSURE: 120 MMHG | DIASTOLIC BLOOD PRESSURE: 89 MMHG

## 2021-02-17 VITALS — DIASTOLIC BLOOD PRESSURE: 86 MMHG | SYSTOLIC BLOOD PRESSURE: 121 MMHG

## 2021-02-17 LAB
ALBUMIN SERPL-MCNC: 2.1 G/DL (ref 3.4–5)
ALBUMIN/GLOB SERPL: 0.5 {RATIO} (ref 1–1.7)
ALP SERPL-CCNC: 91 U/L (ref 46–116)
ALT SERPL-CCNC: 184 U/L (ref 16–63)
ANION GAP SERPL CALC-SCNC: 9 MMOL/L (ref 6–14)
AST SERPL-CCNC: 40 U/L (ref 15–37)
BILIRUB SERPL-MCNC: 0.3 MG/DL (ref 0.2–1)
BUN/CREAT SERPL: 23 (ref 6–20)
CA-I SERPL ISE-MCNC: 18 MG/DL (ref 8–26)
CALCIUM SERPL-MCNC: 8.1 MG/DL (ref 8.5–10.1)
CHLORIDE SERPL-SCNC: 103 MMOL/L (ref 98–107)
CO2 SERPL-SCNC: 26 MMOL/L (ref 21–32)
CREAT SERPL-MCNC: 0.8 MG/DL (ref 0.7–1.3)
ERYTHROCYTE [DISTWIDTH] IN BLOOD BY AUTOMATED COUNT: 12.9 % (ref 11.5–14.5)
GFR SERPLBLD BASED ON 1.73 SQ M-ARVRAT: 101.1 ML/MIN
GLOBULIN SER-MCNC: 4.3 G/DL (ref 2.2–3.8)
GLUCOSE SERPL-MCNC: 95 MG/DL (ref 70–99)
HCT VFR BLD CALC: 39.6 % (ref 39–53)
HGB BLD-MCNC: 13.3 G/DL (ref 13–17.5)
MCH RBC QN AUTO: 30 PG (ref 25–35)
MCHC RBC AUTO-ENTMCNC: 34 G/DL (ref 31–37)
MCV RBC AUTO: 90 FL (ref 79–100)
PLATELET # BLD AUTO: 432 X10^3/UL (ref 140–400)
POTASSIUM SERPL-SCNC: 4.1 MMOL/L (ref 3.5–5.1)
PROT SERPL-MCNC: 6.4 G/DL (ref 6.4–8.2)
RBC # BLD AUTO: 4.43 X10^6/UL (ref 4.3–5.7)
SODIUM SERPL-SCNC: 138 MMOL/L (ref 136–145)
WBC # BLD AUTO: 8.1 X10^3/UL (ref 4–11)

## 2021-02-17 RX ADMIN — Medication SCH CAP: at 09:18

## 2021-02-17 RX ADMIN — IPRATROPIUM BROMIDE AND ALBUTEROL SCH PUFF: 20; 100 SPRAY, METERED RESPIRATORY (INHALATION) at 12:00

## 2021-02-17 RX ADMIN — ACETAMINOPHEN PRN MG: 500 TABLET ORAL at 11:56

## 2021-02-17 RX ADMIN — DEXAMETHASONE SODIUM PHOSPHATE SCH MG: 4 INJECTION, SOLUTION INTRAMUSCULAR; INTRAVENOUS at 09:18

## 2021-02-17 RX ADMIN — AZITHROMYCIN SCH MG: 250 TABLET, FILM COATED ORAL at 09:18

## 2021-02-17 RX ADMIN — IPRATROPIUM BROMIDE AND ALBUTEROL SCH PUFF: 20; 100 SPRAY, METERED RESPIRATORY (INHALATION) at 08:00

## 2021-02-17 NOTE — DS
DATE OF DISCHARGE:  02/17/2021



ATTENDING PHYSICIAN:  Dr. Ortiz



FINAL DISCHARGE DIAGNOSES:

1.  COVID-19 pneumonia.

2.  Acute hypoxemic respiratory failure, resolved.

3.  History of migraine headaches, stable.

4.  Gastroesophageal reflux disease.

5.  Hyperlipidemia.



HISTORY AND PHYSICAL:  The patient is a pleasant, active 55-year-old retired

, still active.  He was admitted with increased shortness of breath and

hypoxemic respiratory failure consistent with COVID-19 pneumonia.



PHYSICAL EXAMINATION:  Please see the dictated note.



PERTINENT LABORATORY AND X-RAY STUDIES:  Admission hemoglobin was 14.0 g/dL,

white count 7200.  Chemistry panel is within normal range.  BUN was 18,

creatinine 0.8 mg percent.  Nonfasting blood sugar was adequate.



IMAGING STUDIES:  His CT angiogram on admission showed no evidence of pulmonary

thromboembolic disease, extensive bilateral ground glass opacity consolidation

consistent with atypical pneumonia.



COURSE IN THE HOSPITAL:  The patient was admitted.  He received intravenous

Decadron, supplemental oxygen, and breathing treatments with marked improvement.

 He did well.  Symptoms were improved.  He had a mild cough, nonproductive.  He

remained afebrile.  By the sixth hospital day, his vital signs were stable. 

Oxygen saturations were down to 94% on room air.  He was afebrile and he was

ready for discharge.  Lungs sounded clear and he felt well.  At this time, I

recommended 7 more days of Decadron 4 mg tabs 2 daily and then stop, Tussionex 5

mL q. 12 hours p.r.n. cough and continuation of his p.r.n. Zomig and Protonix. 

[place].  The patient was then discharged

from our hospital in stable condition with explicit instructions and followup

care.  He did receive in the hospital 5 days of empiric antibiotics too in

addition to the COVID treatment.



TOTAL DISCHARGE TIME SPENT:  38 minutes.





______________________________

FRANKI DALTON MD



DR:  SEUN/juan carlos  JOB#:  [id number] / [id number]

DD:  02/17/2021 11:58  DT:  02/17/2021 12:12

## 2021-02-17 NOTE — NUR
PATIENT HAS O2 SAT AT 92-94%  ON RA. PATIENT IS DISCHARGED HOME, DISCHARGE INSTRUCTIONS 
REVIEWED, PATIENT VERBALIZED UNDERSTANDING , PATIENT STATED HE HAS PULSE OXIMETER AT HOME TO 
MONITOR HIS O2. PATIENT LEFT ROOM VIA AMBULATION ACCOMP BY STAFF MEMBER. PATIENT IS TAKEN 
HOME BY HIS WIFE VIA PERSONAL VEHICLE.